# Patient Record
Sex: MALE | Race: WHITE
[De-identification: names, ages, dates, MRNs, and addresses within clinical notes are randomized per-mention and may not be internally consistent; named-entity substitution may affect disease eponyms.]

---

## 2017-06-04 ENCOUNTER — HOSPITAL ENCOUNTER (EMERGENCY)
Dept: HOSPITAL 80 - FED | Age: 62
Discharge: HOME | End: 2017-06-04
Payer: COMMERCIAL

## 2017-06-04 VITALS
DIASTOLIC BLOOD PRESSURE: 85 MMHG | OXYGEN SATURATION: 94 % | RESPIRATION RATE: 16 BRPM | HEART RATE: 83 BPM | SYSTOLIC BLOOD PRESSURE: 166 MMHG | TEMPERATURE: 97.9 F

## 2017-06-04 DIAGNOSIS — K42.9: Primary | ICD-10-CM

## 2017-06-04 DIAGNOSIS — I10: ICD-10-CM

## 2017-06-04 NOTE — EDPHY
H & P


Stated Complaint: abd pain umbilica hernia pain


Time Seen by Provider: 06/04/17 10:22


HPI/ROS: 





CHIEF COMPLAINT:  Incarcerated umbilical hernia, abdominal pain





HISTORY OF PRESENT ILLNESS:  The patient presents to the ED with complaints of 

abdominal pain from an incarcerated umbilical hernia.  The patient has had a 

longstanding history of an umbilical hernia which is typically reducible.  He 

was unable to reduce the hernia today.  The patient denies any fever, nausea, 

vomiting or other complaints.  The patient has not been evaluated by a surgeon 

for possible hernia repair.  





REVIEW OF SYSTEMS:


A comprehensive 10 point review of systems is otherwise negative aside from 

elements mentioned in the history of present illness.





- Personal History


Current Tetanus/Diphtheria Vaccine: Yes


Current Tetanus Diphtheria and Acellular Pertussis (TDAP): Yes





- Medical/Surgical History


Hx Asthma: No


Hx Chronic Respiratory Disease: No


Hx Diabetes: No


Hx Cardiac Disease: No


Hx Renal Disease: No


Hx Cirrhosis: No


Hx Alcoholism: No


Hx HIV/AIDS: No


Hx Splenectomy or Spleen Trauma: No


Other PMH: HTN





- Social History


Smoking Status: Never smoked





- Physical Exam


Exam: 





General Appearance:  Alert, no distress


Respiratory:  There are no retractions, lungs are clear to auscultation


Cardiovascular:  Regular rate and rhythm


Gastrointestinal:  Small incarcerated umbilical hernia





Constitutional: 


 Initial Vital Signs











Temperature (C)  36.6 C   06/04/17 10:14


 


Heart Rate  83   06/04/17 10:14


 


Respiratory Rate  16   06/04/17 10:14


 


Blood Pressure  166/85 H  06/04/17 10:14


 


O2 Sat (%)  94   06/04/17 10:14








 











O2 Delivery Mode               Room Air














Allergies/Adverse Reactions: 


 





erythromycin base [From E-Mycin] Allergy (Verified 06/04/17 10:17)


 











Medical Decision Making


ED Course/Re-evaluation: 





I reduced the patient's umbilical hernia without complication.  The patient was 

observed in the emergency department for 1 hour without recurrence of pain, the 

patient had multiple examinations and remained pain-free.  I do not feel that 

imaging is indicated at this point time.  The patient will be discharged home 

with instructions to be evaluated by General surgery for further care.


Differential Diagnosis: 





Differential diagnosis considered includes incarcerated hernia, strangulated 

hernia, appendicitis, diverticulitis





Departure





- Departure


Disposition: Home, Routine, Self-Care


Clinical Impression: 


 Umbilical hernia





Condition: Good


Instructions:  Umbilical Hernia (ED)


Additional Instructions: 


1. Please schedule a follow-up appointment with his surgeon you have been 

referred to.


2. Please return to the ED for recurrent abdominal pain, an irreducible hernia, 

fever, vomiting or other concerns.


Referrals: 


Riaz Alvarez MD [Medical Doctor] - As per Instructions

## 2018-06-21 ENCOUNTER — HOSPITAL ENCOUNTER (OUTPATIENT)
Dept: HOSPITAL 80 - FIMAGING | Age: 63
End: 2018-06-21
Attending: INTERNAL MEDICINE
Payer: COMMERCIAL

## 2018-06-21 DIAGNOSIS — Z87.828: ICD-10-CM

## 2018-06-21 DIAGNOSIS — S06.0X9A: Primary | ICD-10-CM
